# Patient Record
Sex: MALE | Race: WHITE
[De-identification: names, ages, dates, MRNs, and addresses within clinical notes are randomized per-mention and may not be internally consistent; named-entity substitution may affect disease eponyms.]

---

## 2018-09-13 ENCOUNTER — HOSPITAL ENCOUNTER (EMERGENCY)
Dept: HOSPITAL 88 - ER | Age: 73
Discharge: HOME | End: 2018-09-13
Payer: OTHER GOVERNMENT

## 2018-09-13 VITALS — HEIGHT: 71 IN | WEIGHT: 218 LBS | BODY MASS INDEX: 30.52 KG/M2

## 2018-09-13 DIAGNOSIS — R05: Primary | ICD-10-CM

## 2018-09-13 DIAGNOSIS — J20.9: ICD-10-CM

## 2018-09-13 DIAGNOSIS — R06.09: ICD-10-CM

## 2018-09-13 LAB
ALBUMIN SERPL-MCNC: 4.4 G/DL (ref 3.5–5)
ALBUMIN/GLOB SERPL: 1.5 {RATIO} (ref 0.8–2)
ALP SERPL-CCNC: 48 IU/L (ref 40–150)
ALT SERPL-CCNC: 14 IU/L (ref 0–55)
ANION GAP SERPL CALC-SCNC: 14.1 MMOL/L (ref 8–16)
BACTERIA URNS QL MICRO: (no result) /HPF
BASOPHILS # BLD AUTO: 0.1 10*3/UL (ref 0–0.1)
BASOPHILS NFR BLD AUTO: 0.5 % (ref 0–1)
BILIRUB UR QL: NEGATIVE
BUN SERPL-MCNC: 33 MG/DL (ref 7–26)
BUN/CREAT SERPL: 29 (ref 6–25)
CALCIUM SERPL-MCNC: 10 MG/DL (ref 8.4–10.2)
CHLORIDE SERPL-SCNC: 110 MMOL/L (ref 98–107)
CK MB SERPL-MCNC: 1.3 NG/ML (ref 0–5)
CK SERPL-CCNC: 39 IU/L (ref 30–200)
CLARITY UR: CLEAR
CO2 SERPL-SCNC: 19 MMOL/L (ref 22–29)
COLOR UR: YELLOW
DEPRECATED NEUTROPHILS # BLD AUTO: 5.6 10*3/UL (ref 2.1–6.9)
DEPRECATED RBC URNS MANUAL-ACNC: (no result) /HPF (ref 0–5)
EGFRCR SERPLBLD CKD-EPI 2021: > 60 ML/MIN (ref 60–?)
EOSINOPHIL # BLD AUTO: 0.7 10*3/UL (ref 0–0.4)
EOSINOPHIL NFR BLD AUTO: 7.9 % (ref 0–6)
EPI CELLS URNS QL MICRO: (no result) /LPF
ERYTHROCYTE [DISTWIDTH] IN CORD BLOOD: 13.2 % (ref 11.7–14.4)
GLOBULIN PLAS-MCNC: 2.9 G/DL (ref 2.3–3.5)
GLUCOSE SERPLBLD-MCNC: 110 MG/DL (ref 74–118)
HCT VFR BLD AUTO: 39.9 % (ref 38.2–49.6)
HGB BLD-MCNC: 13.7 G/DL (ref 14–18)
KETONES UR QL STRIP.AUTO: NEGATIVE
LEUKOCYTE ESTERASE UR QL STRIP.AUTO: NEGATIVE
LYMPHOCYTES # BLD: 2.2 10*3/UL (ref 1–3.2)
LYMPHOCYTES NFR BLD AUTO: 23.8 % (ref 18–39.1)
MCH RBC QN AUTO: 31.5 PG (ref 28–32)
MCHC RBC AUTO-ENTMCNC: 34.3 G/DL (ref 31–35)
MCV RBC AUTO: 91.7 FL (ref 81–99)
MONOCYTES # BLD AUTO: 0.7 10*3/UL (ref 0.2–0.8)
MONOCYTES NFR BLD AUTO: 7.8 % (ref 4.4–11.3)
NEUTS SEG NFR BLD AUTO: 59.7 % (ref 38.7–80)
NITRITE UR QL STRIP.AUTO: NEGATIVE
PLATELET # BLD AUTO: 176 X10E3/UL (ref 140–360)
POTASSIUM SERPL-SCNC: 5.1 MMOL/L (ref 3.5–5.1)
PROT UR QL STRIP.AUTO: NEGATIVE
RBC # BLD AUTO: 4.35 X10E6/UL (ref 4.3–5.7)
SODIUM SERPL-SCNC: 138 MMOL/L (ref 136–145)
SP GR UR STRIP: 1.03 (ref 1.01–1.02)
UROBILINOGEN UR STRIP-MCNC: 0.2 MG/DL (ref 0.2–1)
WBC #/AREA URNS HPF: (no result) /HPF (ref 0–5)

## 2018-09-13 PROCEDURE — 81001 URINALYSIS AUTO W/SCOPE: CPT

## 2018-09-13 PROCEDURE — 36415 COLL VENOUS BLD VENIPUNCTURE: CPT

## 2018-09-13 PROCEDURE — 94640 AIRWAY INHALATION TREATMENT: CPT

## 2018-09-13 PROCEDURE — 87070 CULTURE OTHR SPECIMN AEROBIC: CPT

## 2018-09-13 PROCEDURE — 80053 COMPREHEN METABOLIC PANEL: CPT

## 2018-09-13 PROCEDURE — 84484 ASSAY OF TROPONIN QUANT: CPT

## 2018-09-13 PROCEDURE — 82550 ASSAY OF CK (CPK): CPT

## 2018-09-13 PROCEDURE — 71045 X-RAY EXAM CHEST 1 VIEW: CPT

## 2018-09-13 PROCEDURE — 93005 ELECTROCARDIOGRAM TRACING: CPT

## 2018-09-13 PROCEDURE — 99284 EMERGENCY DEPT VISIT MOD MDM: CPT

## 2018-09-13 PROCEDURE — 83880 ASSAY OF NATRIURETIC PEPTIDE: CPT

## 2018-09-13 PROCEDURE — 82553 CREATINE MB FRACTION: CPT

## 2018-09-13 PROCEDURE — 85025 COMPLETE CBC W/AUTO DIFF WBC: CPT

## 2018-09-13 PROCEDURE — 87205 SMEAR GRAM STAIN: CPT

## 2018-09-13 PROCEDURE — 87400 INFLUENZA A/B EACH AG IA: CPT

## 2018-09-13 NOTE — DIAGNOSTIC IMAGING REPORT
EXAMINATION:  CHEST SINGLE (PORTABLE)    



INDICATION:       

\S\ERMD ORDER

\S\[id number]

\S\1050

\S\Y    



COMPARISON:  None

     

FINDINGS:  AP view   



TUBES and LINES:  None.



LUNGS:  Lungs are well inflated.  Lungs are clear.   There is no evidence of

pneumonia or pulmonary edema.



PLEURA:  No pleural effusion or pneumothorax.



HEART AND MEDIASTINUM:  The cardiomediastinal silhouette is unremarkable..  



BONES AND SOFT TISSUES:  No acute osseous lesion.  Soft tissues are

unremarkable.



UPPER ABDOMEN: No free air under the diaphragm.    



IMPRESSION: 

No acute thoracic abnormality.





Signed by: Dr. Hamida Santiago M.D. on 9/13/2018 2:19 PM

## 2018-10-24 ENCOUNTER — HOSPITAL ENCOUNTER (OUTPATIENT)
Dept: HOSPITAL 88 - OR | Age: 73
Discharge: HOME | End: 2018-10-24
Attending: INTERNAL MEDICINE
Payer: MEDICARE

## 2018-10-24 VITALS — SYSTOLIC BLOOD PRESSURE: 113 MMHG | DIASTOLIC BLOOD PRESSURE: 67 MMHG

## 2018-10-24 DIAGNOSIS — K64.5: ICD-10-CM

## 2018-10-24 DIAGNOSIS — D12.3: ICD-10-CM

## 2018-10-24 DIAGNOSIS — J45.909: ICD-10-CM

## 2018-10-24 DIAGNOSIS — Z88.1: ICD-10-CM

## 2018-10-24 DIAGNOSIS — K58.9: ICD-10-CM

## 2018-10-24 DIAGNOSIS — I25.10: ICD-10-CM

## 2018-10-24 DIAGNOSIS — I10: ICD-10-CM

## 2018-10-24 DIAGNOSIS — K57.30: ICD-10-CM

## 2018-10-24 DIAGNOSIS — K27.9: ICD-10-CM

## 2018-10-24 DIAGNOSIS — K31.89: ICD-10-CM

## 2018-10-24 DIAGNOSIS — E78.00: ICD-10-CM

## 2018-10-24 DIAGNOSIS — Z91.09: ICD-10-CM

## 2018-10-24 DIAGNOSIS — K44.9: ICD-10-CM

## 2018-10-24 DIAGNOSIS — K22.10: ICD-10-CM

## 2018-10-24 DIAGNOSIS — D12.5: ICD-10-CM

## 2018-10-24 DIAGNOSIS — K29.70: Primary | ICD-10-CM

## 2018-10-24 LAB
BASOPHILS # BLD AUTO: 0 10*3/UL (ref 0–0.1)
BASOPHILS NFR BLD AUTO: 0.4 % (ref 0–1)
DEPRECATED NEUTROPHILS # BLD AUTO: 5.8 10*3/UL (ref 2.1–6.9)
EOSINOPHIL # BLD AUTO: 0.4 10*3/UL (ref 0–0.4)
EOSINOPHIL NFR BLD AUTO: 3.9 % (ref 0–6)
ERYTHROCYTE [DISTWIDTH] IN CORD BLOOD: 13.1 % (ref 11.7–14.4)
HCT VFR BLD AUTO: 45.4 % (ref 38.2–49.6)
HGB BLD-MCNC: 14.9 G/DL (ref 14–18)
LYMPHOCYTES # BLD: 2 10*3/UL (ref 1–3.2)
LYMPHOCYTES NFR BLD AUTO: 21.7 % (ref 18–39.1)
MCH RBC QN AUTO: 30.5 PG (ref 28–32)
MCHC RBC AUTO-ENTMCNC: 32.8 G/DL (ref 31–35)
MCV RBC AUTO: 92.8 FL (ref 81–99)
MONOCYTES # BLD AUTO: 0.8 10*3/UL (ref 0.2–0.8)
MONOCYTES NFR BLD AUTO: 8.7 % (ref 4.4–11.3)
NEUTS SEG NFR BLD AUTO: 65.1 % (ref 38.7–80)
PLATELET # BLD AUTO: 223 X10E3/UL (ref 140–360)
RBC # BLD AUTO: 4.89 X10E6/UL (ref 4.3–5.7)

## 2018-10-24 PROCEDURE — 43239 EGD BIOPSY SINGLE/MULTIPLE: CPT

## 2018-10-24 PROCEDURE — 84132 ASSAY OF SERUM POTASSIUM: CPT

## 2018-10-24 PROCEDURE — 45384 COLONOSCOPY W/LESION REMOVAL: CPT

## 2018-10-24 PROCEDURE — 45378 DIAGNOSTIC COLONOSCOPY: CPT

## 2018-10-24 PROCEDURE — 45385 COLONOSCOPY W/LESION REMOVAL: CPT

## 2018-10-24 PROCEDURE — 36415 COLL VENOUS BLD VENIPUNCTURE: CPT

## 2018-10-24 PROCEDURE — 85025 COMPLETE CBC W/AUTO DIFF WBC: CPT

## 2018-10-24 NOTE — XMS REPORT
Patient Summary Document

                             Created on: 10/24/2018



NELLY AVELAR

External Reference #: [id number]

: 1945

Sex: Male



Demographics







                          Address                   [address] [place]  [address]

 

                          Home Phone                [phone]

 

                          Preferred Language        Unknown

 

                          Marital Status            Unknown

 

                          Zoroastrian Affiliation     Unknown

 

                          Race                      Unknown

 

                                        Additional Race(s)  

 

                          Ethnic Group              Unknown





Author







                          Author                    [facility]

 

                          Address                   Unknown

 

                          Phone                     Unavailable







Support







                Name            Relationship    Address         Phone

 

                    NELLY AVELAR    PRS                 .

,   [address] [phone]

 

                    NELLY AVELAR    PRS                 .

,   [phone]

 

                    ANTONELLA AVELAR    PRS                 [address] [place]  [address] [phone]







Care Team Providers







                    Care Team Member Name    Role                Phone

 

                    LAWRENCE BEDOYA    Unavailable         Unavailable







Payers







             Payer Name    Policy Type    Policy Number    Effective Date    Expiration Date







Problems

This patient has no known problems.



Allergies, Adverse Reactions, Alerts

This patient has no known allergies or adverse reactions.



Medications

This patient has no known medications.



Results







           Test Description    Test Time    Test Comments    Text Results    Atomic Results    Result

 Comments

 

                CHEST SINGLE (PORTABLE)    2018 14:18:00                        [place] [address]      Patient 
Name: NELLY AVELAR   MR #: [id number]    : 1945 Age/Sex: 73/M  
Acct #: [id number] Req #: [id number]  Adm Physician:     Ordered by: JULIETTE BEDOYA MD  Report #: [id number]   Location: ER  Room/Bed:     
__________________________________________________________________________
_________________________    Procedure: [id number] DX/CHEST SINGLE (PORTABLE)  
Exam Date: 18                            Exam Time: 1050       REPORT 
STATUS: Signed    EXAMINATION:  CHEST SINGLE (PORTABLE)          INDICATION:    
                    COMPARISON:  None           FINDINGS:  AP view         TUBES
and LINES:  None.      LUNGS:  Lungs are well inflated.  Lungs are clear.   
There is no evidence of   pneumonia or pulmonary edema.      PLEURA:  No pleural
effusion or pneumothorax.      HEART AND MEDIASTINUM:  The cardiomediastinal 
silhouette is unremarkable..        BONES AND SOFT TISSUES:  No acute osseous 
lesion.  Soft tissues are   unremarkable.      UPPER ABDOMEN: No free air under 
the diaphragm.          IMPRESSION:    No acute thoracic abnormality.         
Signed by: Dr. Kailash Santiago M.D. on 2018 2:19 PM        
Dictated By: KAILASH SANTIAGO MD  Electronically Signed By: KAILASH SANTIAGO MD on 18 1419  Transcribed By: AUGUSTA on 18 1419    
  COPY TO:   JULIETTE BEDOYA MD

## 2018-10-24 NOTE — OPERATIVE REPORT
DATE OF PROCEDURE:  October 24, 2018 



REFERRING PHYSICIAN:  Dr. Mike Niño 



PROCEDURES PERFORMED

1. Esophagogastroduodenoscopy with biopsies.  

2. Colonoscopy with polypectomy.  



INDICATIONS FOR EGD:  Upper abdominal pain, "bloating," early satiety.  



INDICATIONS FOR COLONOSCOPY:  Colorectal cancer surveillance, personal 

history of colon polyps.



MEDICATION:  Patient was done under MAC.  Please see anesthesiologist's 

note.



PROCEDURE:  With the patient in the left lateral decubitus position, the 

flexible fiberoptic Olympus gastroscope was introduced into the esophagus 

under direct visualization without any difficulty.  There was some patchy 

erythema noted in the distal esophagus.  There was an ulcerated nodule 

noted at the GE junction, and that was biopsied.  The scope was then 

advanced with ease into the stomach, traversing a small sliding hiatal 

hernia.  Mucosa overlying the antrum and the body revealed some patchy 

erythema and low-grade to moderate edema, and biopsies were obtained and 

sent to stain for H. pylori.  Pylorus appeared to be of normal contour and 

shape.  It was intubated with ease, and the scope was advanced all the way 

to the 2nd portion of the duodenum.  Mucosa overlying the proximal 2nd 

portion appeared to be within normal limits.  Several minute ulcers were 

noted in the duodenal bulb without active bleeding or stigmata of recent 

hemorrhage.  The scope was then withdrawn back into the stomach and 

retroflexed.  Mucosa overlying the fundus and the cardia appeared to be 

within normal limits.  The scope was then straightened out.  The stomach 

was decompressed.  Scope was subsequently withdrawn.  Patient tolerated the 

procedure well.



IMPRESSION

1. Mild distal esophagitis.

2. Ulcerated nodule at gastroesophageal junction, biopsied.

3. Small sliding hiatal hernia.

4. Gastritis, biopsied.  Biopsies sent to stain for H. pylori. 

5. Several minute ulcers, duodenal bulb, without active bleeding or 

stigmata of recent hemorrhage.



PLAN:  Follow up histology.  Initiate Protonix 40 mg 1 p.o. q.a.m. a.c.



The patient was then turned around.  After adequate lubrication of the anal 

canal, a flexible fiberoptic Olympus colonoscope was inserted into the 

rectum with ease and advanced all the way to the cecum.  It was then 

withdrawn slowly.  The mucosa overlying the cecum appeared to be within 

normal limits.   One polyp was snared from the distal ascending colon.  One 

polyp was hot biopsied from the transverse colon.  One polyp was snared and 

1 polyp was hot biopsied from the descending colon.  Diverticular disease 

was noted to involve the sigmoid colon.  One polyp was snared from the 

sigmoid colon.  The rectum appeared to be within normal limits.  The scope 

was then retroflexed into the distal rectum, and moderate-size internal 

hemorrhoids were noted, none of which was actively bleeding.  The scope was 

then straightened out.  It was subsequently withdrawn.  A thrombosed 

external hemorrhoid was noted on the way out.  Patient tolerated the 

procedure well.  



IMPRESSION

1. Ascending colon polyp, snared.

2. Transverse colon polyp, hot biopsied.

3. Descending colon polyps times 2, one snared and one hot biopsied.

4. Diverticulosis.

5. Sigmoid colon polyp, snared.

6. Internal hemorrhoids, none actively bleeding.

7. Thrombosed external hemorrhoid.  



PLAN:  Follow up histology.  Initiate high-fiber, low-fat diet.  Initiate 

high-fiber supplement.  

Start Proctocream-HC 2.5%, 30 grams, apply b.i.d. times 10 days, then 

p.r.n.  Patient might benefit from a followup colonoscopy in 3 years.  











DD:  10/24/2018 10:34

DT:  10/24/2018 10:59

Job#:  [id number] 



cc:MIKE NIÑO M.D.